# Patient Record
Sex: FEMALE | Race: BLACK OR AFRICAN AMERICAN | NOT HISPANIC OR LATINO | Employment: PART TIME | URBAN - METROPOLITAN AREA
[De-identification: names, ages, dates, MRNs, and addresses within clinical notes are randomized per-mention and may not be internally consistent; named-entity substitution may affect disease eponyms.]

---

## 2017-03-07 ENCOUNTER — TELEPHONE (OUTPATIENT)
Dept: OBSTETRICS AND GYNECOLOGY | Facility: CLINIC | Age: 27
End: 2017-03-07

## 2017-03-07 NOTE — TELEPHONE ENCOUNTER
CALLED PT TO SEE SHE WANTS TO RESCHEDULE HER DEPO APPOINTMENT, SHE STATED SHE IS OUT OF TOWN IN [place], INFORMED HER SHE WILL HAVE TO MAKE A DR'S APPOINTMENT TO RESTART HER DEPO, PT STATED HER UNDERSTANDING

## 2018-02-07 ENCOUNTER — LAB VISIT (OUTPATIENT)
Dept: LAB | Facility: HOSPITAL | Age: 28
End: 2018-02-07
Attending: OBSTETRICS & GYNECOLOGY
Payer: MEDICAID

## 2018-02-07 ENCOUNTER — OFFICE VISIT (OUTPATIENT)
Dept: OBSTETRICS AND GYNECOLOGY | Facility: CLINIC | Age: 28
End: 2018-02-07
Payer: MEDICAID

## 2018-02-07 VITALS
DIASTOLIC BLOOD PRESSURE: 71 MMHG | SYSTOLIC BLOOD PRESSURE: 118 MMHG | WEIGHT: 161.69 LBS | HEIGHT: 66 IN | BODY MASS INDEX: 25.98 KG/M2

## 2018-02-07 DIAGNOSIS — Z00.00 ANNUAL PHYSICAL EXAM: Primary | ICD-10-CM

## 2018-02-07 DIAGNOSIS — Z01.419 ENCOUNTER FOR GYNECOLOGICAL EXAMINATION WITHOUT ABNORMAL FINDING: ICD-10-CM

## 2018-02-07 DIAGNOSIS — Z20.2 POSSIBLE EXPOSURE TO STD: ICD-10-CM

## 2018-02-07 PROCEDURE — 86703 HIV-1/HIV-2 1 RESULT ANTBDY: CPT

## 2018-02-07 PROCEDURE — 80074 ACUTE HEPATITIS PANEL: CPT

## 2018-02-07 PROCEDURE — 3008F BODY MASS INDEX DOCD: CPT | Mod: ,,, | Performed by: OBSTETRICS & GYNECOLOGY

## 2018-02-07 PROCEDURE — 36415 COLL VENOUS BLD VENIPUNCTURE: CPT

## 2018-02-07 PROCEDURE — 99999 PR PBB SHADOW E&M-EST. PATIENT-LVL III: CPT | Mod: PBBFAC,,, | Performed by: OBSTETRICS & GYNECOLOGY

## 2018-02-07 PROCEDURE — 99213 OFFICE O/P EST LOW 20 MIN: CPT | Mod: PBBFAC,25 | Performed by: OBSTETRICS & GYNECOLOGY

## 2018-02-07 PROCEDURE — 88175 CYTOPATH C/V AUTO FLUID REDO: CPT

## 2018-02-07 PROCEDURE — 99395 PREV VISIT EST AGE 18-39: CPT | Mod: S$PBB,,, | Performed by: OBSTETRICS & GYNECOLOGY

## 2018-02-07 PROCEDURE — 87491 CHLMYD TRACH DNA AMP PROBE: CPT

## 2018-02-07 PROCEDURE — 87480 CANDIDA DNA DIR PROBE: CPT

## 2018-02-07 NOTE — PROGRESS NOTES
Subjective:      Chief Complaint:    Chief Complaint   Patient presents with    Gynecologic Exam       Menstrual History:    OB History      Para Term  AB Living    4 2 2   2 2    SAB TAB Ectopic Multiple Live Births      2     2          Menarche age: 12     Patient's last menstrual period was 2018.            Objective:        History of Present Illness AND  Examination detailed DICTATE:         HISTORY OF PRESENT ILLNESS:  The patient is 27 years of age, here for annual   exam,  4, para 2, last menstrual period on the patient was 2018,   was in the past on Depo-Provera, discontinued.  The patient's past medical   history, herpes labialis.  Knee surgery and .  The patient is concerned   with possible STD exposure, requested cultures and blood test.  No other   problems.    REVIEW OF SYSTEMS:  HEAD, EAR, EYES, NOSE, AND THROAT:  Negative.  CARDIORESPIRATORY:  Negative.  GASTROINTESTINAL:  Negative.  GENITOURINARY:  See present illness.  NEUROMUSCULAR:  No problem or complaint.  SKIN:  The patient has a dark mole in the center of the chest, which is rather   dark, flat, irregular borders, no hair in it.  I recommended the patient to see   a dermatologist for a biopsy of this area.  She had a previous biopsy on her   abdomen.  I strongly urged the patient to go and have this area biopsied.    PHYSICAL EXAMINATION:  VITAL SIGNS:  Blood pressure 118/71, weight 161.  GENERAL:  Well developed, well nourished, alert, oriented, no problems.  HEAD:  Normocephalic.  EYES:  Reactive.  NECK:  Supple.  Thyroid is not palpable, no nodes.  CHEST:  Lungs clear except a mole on the chest.  HEART:  Regular sinus rhythm, no murmur.  BREASTS:  No lumps, masses, discharge, skin changes, retraction, nipple changes.    Axilla negative.  ABDOMEN:  Upper abdomen normal.  Lower abdomen normal.  PELVIC:  External normal.  Vulva normal.  Bartholin, urethral and [place] glands   are negative.  Vagina  is clear.  Clear mucus noted from the cervix, possible   indicated ovulatory event.  Uterus is normal size, shape, position, mobile.    Adnexa negative.  Good pelvic support.  No pain elicited on exam.  RECTAL:  Negative.  MUSCULOSKELETAL:  Negative.  NEUROLOGIC:  Grossly normal.  SKIN:  Mole, chest.    RECOMMENDATION:  Biopsy.  The patient stated she is not sexually active at the   present time and she does not wish to have any birth control medication.  Pap   smear was taken.  Cultures were taken.  Also, we will do blood test and we will   see the patient back if it is indicated.      SEAN/CHRISTIANO  dd: 02/07/2018 09:08:07 (CST)  td: 02/08/2018 07:53:57 (CST)  Doc ID   #[id number]  Job ID #[id number]    CC:         Assessment:      Diagnosis: gyn   Exam    Std   exposure       Plan:      Return in 12  months

## 2018-02-08 LAB
C TRACH DNA SPEC QL NAA+PROBE: NOT DETECTED
CANDIDA RRNA VAG QL PROBE: NEGATIVE
G VAGINALIS RRNA GENITAL QL PROBE: NEGATIVE
HAV IGM SERPL QL IA: NEGATIVE
HBV CORE IGM SERPL QL IA: NEGATIVE
HBV SURFACE AG SERPL QL IA: NEGATIVE
HCV AB SERPL QL IA: NEGATIVE
HIV 1+2 AB+HIV1 P24 AG SERPL QL IA: NEGATIVE
N GONORRHOEA DNA SPEC QL NAA+PROBE: NOT DETECTED
T VAGINALIS RRNA GENITAL QL PROBE: NEGATIVE

## 2018-08-30 ENCOUNTER — OFFICE VISIT (OUTPATIENT)
Dept: OBSTETRICS AND GYNECOLOGY | Facility: CLINIC | Age: 28
End: 2018-08-30
Payer: MEDICAID

## 2018-08-30 VITALS
DIASTOLIC BLOOD PRESSURE: 60 MMHG | SYSTOLIC BLOOD PRESSURE: 112 MMHG | WEIGHT: 154.56 LBS | HEIGHT: 66 IN | BODY MASS INDEX: 24.84 KG/M2

## 2018-08-30 DIAGNOSIS — N89.8 VAGINAL DISCHARGE: Primary | ICD-10-CM

## 2018-08-30 PROCEDURE — 99213 OFFICE O/P EST LOW 20 MIN: CPT | Mod: PBBFAC | Performed by: OBSTETRICS & GYNECOLOGY

## 2018-08-30 PROCEDURE — 99213 OFFICE O/P EST LOW 20 MIN: CPT | Mod: S$PBB,,, | Performed by: OBSTETRICS & GYNECOLOGY

## 2018-08-30 PROCEDURE — 99999 PR PBB SHADOW E&M-EST. PATIENT-LVL III: CPT | Mod: PBBFAC,,, | Performed by: OBSTETRICS & GYNECOLOGY

## 2018-08-30 PROCEDURE — 87660 TRICHOMONAS VAGIN DIR PROBE: CPT

## 2018-08-30 NOTE — PROGRESS NOTES
Subjective:      Chief Complaint:    Chief Complaint   Patient presents with    Vaginal Discharge       Menstrual History:    OB History      Para Term  AB Living    4 2 2   2 2    SAB TAB Ectopic Multiple Live Births      2     2          Menarche age: 12     Patient's last menstrual period was 2018 (exact date).            Objective:        History of Present Illness AND  Examination detailed DICTATE:   HISTORY OF PRESENT ILLNESS:  The patient is a 28-year-old lady who presented to   clinic because of vaginal discharge.  She is a  4, para 2.  Pap smear in   2018, negative.  Last menstrual period 2018, regular normal cycles without   any problem.    PAST MEDICAL HISTORY:  Herpes labialis.  Surgery, , arthroscopy.  The   patient discontinued Depo-Provera, complaining of discharge, starting about a   couple of weeks ago.  No new partner's exposure.  No odor or any other problem.    PHYSICAL EXAMINATION:  VITAL SIGNS:  Blood pressure 112/60, weight 154.  CHEST:  Clear.  HEART:  Regular sinus rhythm.  ABDOMEN:  Soft.  No guarding.  PELVIC:  External normal.  Vulva normal.  Bartholin, urethral and [place] are   negative.  Vagina has clear mucus.  Cervix is open, clear mucus, possibly   indicating ovulation.  Uterus, normal size, shape, position.  The right adnexa,   small cyst corpus luteum.  RECTAL:  Negative.    IMPRESSION:  Ovulatory phase, probably excessive mucus.    PLAN:  Vaginal culture.  Reassured the patient that essentially do not feel that   she has any problems.  However, if the culture is positive, we will initiate   treatment.      THOM  dd: 2018 15:55:46 (CDT)  td: 2018 10:31:44 (CDT)  Doc ID   #[id number]  Job ID #[id number]    CC:               Assessment:      Diagnosis:VAG   DISCARGE       Plan:      Return in 6  months

## 2018-08-31 LAB
CANDIDA RRNA VAG QL PROBE: NEGATIVE
G VAGINALIS RRNA GENITAL QL PROBE: NEGATIVE
T VAGINALIS RRNA GENITAL QL PROBE: NEGATIVE

## 2019-06-21 PROBLEM — Z20.2 POSSIBLE EXPOSURE TO STD: Status: RESOLVED | Noted: 2018-02-07 | Resolved: 2019-06-21

## 2019-06-21 PROBLEM — N89.8 VAGINAL DISCHARGE: Status: RESOLVED | Noted: 2018-08-30 | Resolved: 2019-06-21

## 2021-02-11 ENCOUNTER — OFFICE VISIT (OUTPATIENT)
Dept: OBSTETRICS AND GYNECOLOGY | Facility: CLINIC | Age: 31
End: 2021-02-11
Payer: MEDICAID

## 2021-02-11 VITALS — BODY MASS INDEX: 26.4 KG/M2 | WEIGHT: 163.56 LBS | DIASTOLIC BLOOD PRESSURE: 64 MMHG | SYSTOLIC BLOOD PRESSURE: 100 MMHG

## 2021-02-11 DIAGNOSIS — B37.31 YEAST VAGINITIS: ICD-10-CM

## 2021-02-11 DIAGNOSIS — Z01.419 VISIT FOR GYNECOLOGIC EXAMINATION: Primary | ICD-10-CM

## 2021-02-11 DIAGNOSIS — B00.2 PRIMARY HSV INFECTION OF MOUTH: ICD-10-CM

## 2021-02-11 PROCEDURE — 99395 PR PREVENTIVE VISIT,EST,18-39: ICD-10-PCS | Mod: S$PBB,,, | Performed by: OBSTETRICS & GYNECOLOGY

## 2021-02-11 PROCEDURE — 99999 PR PBB SHADOW E&M-EST. PATIENT-LVL II: CPT | Mod: PBBFAC,,, | Performed by: OBSTETRICS & GYNECOLOGY

## 2021-02-11 PROCEDURE — 99395 PREV VISIT EST AGE 18-39: CPT | Mod: S$PBB,,, | Performed by: OBSTETRICS & GYNECOLOGY

## 2021-02-11 PROCEDURE — 87660 TRICHOMONAS VAGIN DIR PROBE: CPT

## 2021-02-11 PROCEDURE — 99212 OFFICE O/P EST SF 10 MIN: CPT | Mod: PBBFAC | Performed by: OBSTETRICS & GYNECOLOGY

## 2021-02-11 PROCEDURE — 87481 CANDIDA DNA AMP PROBE: CPT

## 2021-02-11 PROCEDURE — 87510 GARDNER VAG DNA DIR PROBE: CPT

## 2021-02-11 PROCEDURE — 99999 PR PBB SHADOW E&M-EST. PATIENT-LVL II: ICD-10-PCS | Mod: PBBFAC,,, | Performed by: OBSTETRICS & GYNECOLOGY

## 2021-02-11 RX ORDER — FLUCONAZOLE 150 MG/1
150 TABLET ORAL ONCE
Qty: 1 TABLET | Refills: 1 | Status: SHIPPED | OUTPATIENT
Start: 2021-02-11 | End: 2021-02-11

## 2021-02-11 RX ORDER — VALACYCLOVIR HYDROCHLORIDE 500 MG/1
500 TABLET, FILM COATED ORAL 2 TIMES DAILY
Qty: 10 TABLET | Refills: 3 | Status: SHIPPED | OUTPATIENT
Start: 2021-02-11 | End: 2022-10-27 | Stop reason: SDUPTHER

## 2021-02-12 LAB
CANDIDA RRNA VAG QL PROBE: POSITIVE
G VAGINALIS RRNA GENITAL QL PROBE: POSITIVE
T VAGINALIS RRNA GENITAL QL PROBE: NEGATIVE

## 2021-02-20 ENCOUNTER — PATIENT MESSAGE (OUTPATIENT)
Dept: OBSTETRICS AND GYNECOLOGY | Facility: CLINIC | Age: 31
End: 2021-02-20

## 2021-02-20 DIAGNOSIS — N76.0 BV (BACTERIAL VAGINOSIS): Primary | ICD-10-CM

## 2021-02-20 DIAGNOSIS — B96.89 BV (BACTERIAL VAGINOSIS): Primary | ICD-10-CM

## 2021-02-20 RX ORDER — METRONIDAZOLE 7.5 MG/G
1 GEL VAGINAL DAILY
Qty: 70 G | Refills: 0 | Status: SHIPPED | OUTPATIENT
Start: 2021-02-20 | End: 2021-02-25

## 2021-04-15 ENCOUNTER — PATIENT MESSAGE (OUTPATIENT)
Dept: RESEARCH | Facility: HOSPITAL | Age: 31
End: 2021-04-15

## 2021-06-25 ENCOUNTER — TELEPHONE (OUTPATIENT)
Dept: OBSTETRICS AND GYNECOLOGY | Facility: CLINIC | Age: 31
End: 2021-06-25

## 2021-06-25 ENCOUNTER — PATIENT MESSAGE (OUTPATIENT)
Dept: OBSTETRICS AND GYNECOLOGY | Facility: CLINIC | Age: 31
End: 2021-06-25

## 2021-06-25 RX ORDER — METRONIDAZOLE 500 MG/1
500 TABLET ORAL 2 TIMES DAILY
Qty: 10 TABLET | Refills: 1 | Status: SHIPPED | OUTPATIENT
Start: 2021-06-25 | End: 2021-06-30

## 2021-07-01 ENCOUNTER — PATIENT MESSAGE (OUTPATIENT)
Dept: ADMINISTRATIVE | Facility: OTHER | Age: 31
End: 2021-07-01

## 2021-10-12 ENCOUNTER — HOSPITAL ENCOUNTER (EMERGENCY)
Facility: HOSPITAL | Age: 31
Discharge: HOME | End: 2021-10-12
Attending: EMERGENCY MEDICINE
Payer: MEDICAID

## 2021-10-12 VITALS
RESPIRATION RATE: 18 BRPM | HEART RATE: 82 BPM | DIASTOLIC BLOOD PRESSURE: 64 MMHG | OXYGEN SATURATION: 98 % | WEIGHT: 150 LBS | HEIGHT: 68 IN | SYSTOLIC BLOOD PRESSURE: 96 MMHG | TEMPERATURE: 98 F | BODY MASS INDEX: 22.73 KG/M2

## 2021-10-12 DIAGNOSIS — R55 SYNCOPE: Primary | ICD-10-CM

## 2021-10-12 LAB
ALBUMIN SERPL BCP-MCNC: 3.8 G/DL (ref 3.5–5.2)
ALP SERPL-CCNC: 41 U/L (ref 55–135)
ALT SERPL W/O P-5'-P-CCNC: 21 U/L (ref 10–44)
ANION GAP SERPL CALC-SCNC: 8 MMOL/L (ref 8–16)
AST SERPL-CCNC: 21 U/L (ref 10–40)
B-HCG UR QL: NEGATIVE
BACTERIA #/AREA URNS HPF: ABNORMAL /HPF
BASOPHILS # BLD AUTO: 0.04 K/UL (ref 0–0.2)
BASOPHILS NFR BLD: 0.3 % (ref 0–1.9)
BILIRUB SERPL-MCNC: 0.5 MG/DL (ref 0.1–1)
BILIRUB UR QL STRIP: NEGATIVE
BUN SERPL-MCNC: 14 MG/DL (ref 6–20)
CALCIUM SERPL-MCNC: 9.3 MG/DL (ref 8.7–10.5)
CHLORIDE SERPL-SCNC: 111 MMOL/L (ref 95–110)
CLARITY UR: CLEAR
CO2 SERPL-SCNC: 21 MMOL/L (ref 23–29)
COLOR UR: YELLOW
CREAT SERPL-MCNC: 0.8 MG/DL (ref 0.5–1.4)
CTP QC/QA: YES
DIFFERENTIAL METHOD: ABNORMAL
EOSINOPHIL # BLD AUTO: 0 K/UL (ref 0–0.5)
EOSINOPHIL NFR BLD: 0.1 % (ref 0–8)
ERYTHROCYTE [DISTWIDTH] IN BLOOD BY AUTOMATED COUNT: 14.5 % (ref 11.5–14.5)
EST. GFR  (AFRICAN AMERICAN): >60 ML/MIN/1.73 M^2
EST. GFR  (NON AFRICAN AMERICAN): >60 ML/MIN/1.73 M^2
GLUCOSE SERPL-MCNC: 105 MG/DL (ref 70–110)
GLUCOSE UR QL STRIP: NEGATIVE
HCT VFR BLD AUTO: 39.7 % (ref 37–48.5)
HGB BLD-MCNC: 12.5 G/DL (ref 12–16)
HGB UR QL STRIP: NEGATIVE
HYALINE CASTS #/AREA URNS LPF: 0 /LPF
IMM GRANULOCYTES # BLD AUTO: 0.06 K/UL (ref 0–0.04)
IMM GRANULOCYTES NFR BLD AUTO: 0.4 % (ref 0–0.5)
KETONES UR QL STRIP: ABNORMAL
LEUKOCYTE ESTERASE UR QL STRIP: NEGATIVE
LYMPHOCYTES # BLD AUTO: 1.1 K/UL (ref 1–4.8)
LYMPHOCYTES NFR BLD: 7.8 % (ref 18–48)
MCH RBC QN AUTO: 27.7 PG (ref 27–31)
MCHC RBC AUTO-ENTMCNC: 31.5 G/DL (ref 32–36)
MCV RBC AUTO: 88 FL (ref 82–98)
MICROSCOPIC COMMENT: ABNORMAL
MONOCYTES # BLD AUTO: 0.5 K/UL (ref 0.3–1)
MONOCYTES NFR BLD: 3.4 % (ref 4–15)
NEUTROPHILS # BLD AUTO: 12.4 K/UL (ref 1.8–7.7)
NEUTROPHILS NFR BLD: 88 % (ref 38–73)
NITRITE UR QL STRIP: NEGATIVE
NRBC BLD-RTO: 0 /100 WBC
PH UR STRIP: 6 [PH] (ref 5–8)
PLATELET # BLD AUTO: 219 K/UL (ref 150–450)
PMV BLD AUTO: 11.2 FL (ref 9.2–12.9)
POTASSIUM SERPL-SCNC: 4.2 MMOL/L (ref 3.5–5.1)
PROT SERPL-MCNC: 7.6 G/DL (ref 6–8.4)
PROT UR QL STRIP: ABNORMAL
RBC # BLD AUTO: 4.52 M/UL (ref 4–5.4)
RBC #/AREA URNS HPF: 3 /HPF (ref 0–4)
SODIUM SERPL-SCNC: 140 MMOL/L (ref 136–145)
SP GR UR STRIP: >1.03 (ref 1–1.03)
SQUAMOUS #/AREA URNS HPF: 13 /HPF
URN SPEC COLLECT METH UR: ABNORMAL
UROBILINOGEN UR STRIP-ACNC: NEGATIVE EU/DL
WBC # BLD AUTO: 14.1 K/UL (ref 3.9–12.7)
WBC #/AREA URNS HPF: 6 /HPF (ref 0–5)

## 2021-10-12 PROCEDURE — 81025 URINE PREGNANCY TEST: CPT | Performed by: EMERGENCY MEDICINE

## 2021-10-12 PROCEDURE — 93010 EKG 12-LEAD: ICD-10-PCS | Mod: ,,, | Performed by: INTERNAL MEDICINE

## 2021-10-12 PROCEDURE — 93010 ELECTROCARDIOGRAM REPORT: CPT | Mod: ,,, | Performed by: INTERNAL MEDICINE

## 2021-10-12 PROCEDURE — 80053 COMPREHEN METABOLIC PANEL: CPT | Performed by: EMERGENCY MEDICINE

## 2021-10-12 PROCEDURE — 85025 COMPLETE CBC W/AUTO DIFF WBC: CPT | Performed by: EMERGENCY MEDICINE

## 2021-10-12 PROCEDURE — 81000 URINALYSIS NONAUTO W/SCOPE: CPT | Performed by: EMERGENCY MEDICINE

## 2021-10-12 PROCEDURE — 25000003 PHARM REV CODE 250: Performed by: EMERGENCY MEDICINE

## 2021-10-12 PROCEDURE — 93005 ELECTROCARDIOGRAM TRACING: CPT

## 2021-10-12 PROCEDURE — 99284 EMERGENCY DEPT VISIT MOD MDM: CPT | Mod: 25

## 2021-10-12 RX ORDER — HYDROCODONE BITARTRATE AND ACETAMINOPHEN 10; 325 MG/1; MG/1
1 TABLET ORAL
Status: COMPLETED | OUTPATIENT
Start: 2021-10-12 | End: 2021-10-12

## 2021-10-12 RX ORDER — SODIUM CHLORIDE 9 MG/ML
INJECTION, SOLUTION INTRAVENOUS
Status: COMPLETED | OUTPATIENT
Start: 2021-10-12 | End: 2021-10-12

## 2021-10-12 RX ORDER — METRONIDAZOLE 500 MG/1
500 TABLET ORAL 3 TIMES DAILY
Qty: 21 TABLET | Refills: 0 | Status: SHIPPED | OUTPATIENT
Start: 2021-10-12 | End: 2021-10-19

## 2021-10-12 RX ADMIN — HYDROCODONE BITARTRATE AND ACETAMINOPHEN 1 TABLET: 10; 325 TABLET ORAL at 07:10

## 2021-10-12 RX ADMIN — SODIUM CHLORIDE: 0.9 INJECTION, SOLUTION INTRAVENOUS at 07:10

## 2021-10-27 ENCOUNTER — OFFICE VISIT (OUTPATIENT)
Dept: PRIMARY CARE CLINIC | Facility: CLINIC | Age: 31
End: 2021-10-27
Payer: MEDICAID

## 2021-10-27 VITALS
DIASTOLIC BLOOD PRESSURE: 76 MMHG | HEART RATE: 85 BPM | SYSTOLIC BLOOD PRESSURE: 104 MMHG | WEIGHT: 166 LBS | OXYGEN SATURATION: 99 % | BODY MASS INDEX: 25.16 KG/M2 | HEIGHT: 68 IN

## 2021-10-27 DIAGNOSIS — G43.009 MIGRAINE WITHOUT AURA AND WITHOUT STATUS MIGRAINOSUS, NOT INTRACTABLE: Primary | ICD-10-CM

## 2021-10-27 DIAGNOSIS — R41.840 POOR CONCENTRATION: ICD-10-CM

## 2021-10-27 PROCEDURE — 99203 PR OFFICE/OUTPT VISIT, NEW, LEVL III, 30-44 MIN: ICD-10-PCS | Mod: S$PBB,,, | Performed by: NURSE PRACTITIONER

## 2021-10-27 PROCEDURE — 99999 PR PBB SHADOW E&M-EST. PATIENT-LVL III: CPT | Mod: PBBFAC,,, | Performed by: NURSE PRACTITIONER

## 2021-10-27 PROCEDURE — 99203 OFFICE O/P NEW LOW 30 MIN: CPT | Mod: S$PBB,,, | Performed by: NURSE PRACTITIONER

## 2021-10-27 PROCEDURE — 99999 PR PBB SHADOW E&M-EST. PATIENT-LVL III: ICD-10-PCS | Mod: PBBFAC,,, | Performed by: NURSE PRACTITIONER

## 2021-10-27 PROCEDURE — 99213 OFFICE O/P EST LOW 20 MIN: CPT | Mod: PBBFAC,PN | Performed by: NURSE PRACTITIONER

## 2021-10-27 RX ORDER — SUMATRIPTAN 50 MG/1
50 TABLET, FILM COATED ORAL
Qty: 9 TABLET | Refills: 1 | Status: SHIPPED | OUTPATIENT
Start: 2021-10-27 | End: 2021-11-26

## 2022-10-27 ENCOUNTER — OFFICE VISIT (OUTPATIENT)
Dept: OBSTETRICS AND GYNECOLOGY | Facility: CLINIC | Age: 32
End: 2022-10-27
Payer: MEDICAID

## 2022-10-27 ENCOUNTER — LAB VISIT (OUTPATIENT)
Dept: LAB | Facility: HOSPITAL | Age: 32
End: 2022-10-27
Attending: OBSTETRICS & GYNECOLOGY
Payer: MEDICAID

## 2022-10-27 VITALS
SYSTOLIC BLOOD PRESSURE: 108 MMHG | DIASTOLIC BLOOD PRESSURE: 72 MMHG | HEIGHT: 68 IN | BODY MASS INDEX: 25.2 KG/M2 | WEIGHT: 166.25 LBS

## 2022-10-27 DIAGNOSIS — B00.1 HERPES LABIALIS: ICD-10-CM

## 2022-10-27 DIAGNOSIS — Z11.3 SCREEN FOR STD (SEXUALLY TRANSMITTED DISEASE): ICD-10-CM

## 2022-10-27 DIAGNOSIS — B37.31 VAGINAL YEAST INFECTION: Primary | ICD-10-CM

## 2022-10-27 PROCEDURE — 99999 PR PBB SHADOW E&M-EST. PATIENT-LVL III: CPT | Mod: PBBFAC,,, | Performed by: OBSTETRICS & GYNECOLOGY

## 2022-10-27 PROCEDURE — 1160F RVW MEDS BY RX/DR IN RCRD: CPT | Mod: CPTII,,, | Performed by: OBSTETRICS & GYNECOLOGY

## 2022-10-27 PROCEDURE — 3008F BODY MASS INDEX DOCD: CPT | Mod: CPTII,,, | Performed by: OBSTETRICS & GYNECOLOGY

## 2022-10-27 PROCEDURE — 1159F MED LIST DOCD IN RCRD: CPT | Mod: CPTII,,, | Performed by: OBSTETRICS & GYNECOLOGY

## 2022-10-27 PROCEDURE — 1159F PR MEDICATION LIST DOCUMENTED IN MEDICAL RECORD: ICD-10-PCS | Mod: CPTII,,, | Performed by: OBSTETRICS & GYNECOLOGY

## 2022-10-27 PROCEDURE — 1160F PR REVIEW ALL MEDS BY PRESCRIBER/CLIN PHARMACIST DOCUMENTED: ICD-10-PCS | Mod: CPTII,,, | Performed by: OBSTETRICS & GYNECOLOGY

## 2022-10-27 PROCEDURE — 99999 PR PBB SHADOW E&M-EST. PATIENT-LVL III: ICD-10-PCS | Mod: PBBFAC,,, | Performed by: OBSTETRICS & GYNECOLOGY

## 2022-10-27 PROCEDURE — 3078F PR MOST RECENT DIASTOLIC BLOOD PRESSURE < 80 MM HG: ICD-10-PCS | Mod: CPTII,,, | Performed by: OBSTETRICS & GYNECOLOGY

## 2022-10-27 PROCEDURE — 99213 OFFICE O/P EST LOW 20 MIN: CPT | Mod: PBBFAC | Performed by: OBSTETRICS & GYNECOLOGY

## 2022-10-27 PROCEDURE — 3074F PR MOST RECENT SYSTOLIC BLOOD PRESSURE < 130 MM HG: ICD-10-PCS | Mod: CPTII,,, | Performed by: OBSTETRICS & GYNECOLOGY

## 2022-10-27 PROCEDURE — 3074F SYST BP LT 130 MM HG: CPT | Mod: CPTII,,, | Performed by: OBSTETRICS & GYNECOLOGY

## 2022-10-27 PROCEDURE — 99213 OFFICE O/P EST LOW 20 MIN: CPT | Mod: S$PBB,,, | Performed by: OBSTETRICS & GYNECOLOGY

## 2022-10-27 PROCEDURE — 3008F PR BODY MASS INDEX (BMI) DOCUMENTED: ICD-10-PCS | Mod: CPTII,,, | Performed by: OBSTETRICS & GYNECOLOGY

## 2022-10-27 PROCEDURE — 87491 CHLMYD TRACH DNA AMP PROBE: CPT | Performed by: OBSTETRICS & GYNECOLOGY

## 2022-10-27 PROCEDURE — 3078F DIAST BP <80 MM HG: CPT | Mod: CPTII,,, | Performed by: OBSTETRICS & GYNECOLOGY

## 2022-10-27 PROCEDURE — 87591 N.GONORRHOEAE DNA AMP PROB: CPT | Performed by: OBSTETRICS & GYNECOLOGY

## 2022-10-27 PROCEDURE — 99213 PR OFFICE/OUTPT VISIT, EST, LEVL III, 20-29 MIN: ICD-10-PCS | Mod: S$PBB,,, | Performed by: OBSTETRICS & GYNECOLOGY

## 2022-10-27 RX ORDER — VALACYCLOVIR HYDROCHLORIDE 500 MG/1
500 TABLET, FILM COATED ORAL 2 TIMES DAILY PRN
Qty: 30 TABLET | Refills: 3 | Status: SHIPPED | OUTPATIENT
Start: 2022-10-27

## 2022-10-27 RX ORDER — FLUCONAZOLE 150 MG/1
150 TABLET ORAL
Qty: 6 TABLET | Refills: 1 | Status: SHIPPED | OUTPATIENT
Start: 2022-10-27

## 2022-10-27 NOTE — PROGRESS NOTES
"[facility]  Ambulatory Clinic  Obstetrics & Gynecology    Visit Date:  10/27/2022    Chief Complaint:  Vaginal yeast infection, oral herpes on lips    History of Present Illness:      Damon Noguera is a 32 y.o.  here with c/o vaginal yeast infection and oral herpes on lips.    Pt described discharge as itchy, white, non bloody, without pelvic pain or abnormal vaginal bleeding for past few days.    Pt reports h/o oral herpes on lips and improvement with valtrex.      Pt denies abnormal vaginal bleeding, dysmenorrhea, dyspareunia, pelvic pain, breast mass/skin changes, incontinence, GI or urinary complaints.      Review of Systems:      GENERAL:  No fever, fatigue, excessive weight gain or loss  HEENT:  No headaches, hearing changes, visual disturbance  RESPIRATORY:  No cough, shortness of breath  CARDIOVASCULAR:  No chest pain, heart palpitations, leg swelling  GASTROINTESTINAL:  No nausea, vomiting, constipation, diarrhea, abd pain, rectal bleeding   GENITOURINARY:  See HPI    Physical Exam:     /72   Ht 5' 8" (1.727 m)   Wt 75.4 kg (166 lb 3.6 oz)   LMP 10/02/2022   BMI 25.27 kg/m²   Pulse 50's, Resp rate 16     GENERAL:  No acute distress, well-nourished  HEENT:  Atraumatic, anicteric, moist mucus membranes. Neck supple w/o masses. Resolving small herpetic sore on lip.  LUNGS:  Clear normal respiratory effort  HEART:  Regular rate and rhythm  ABDOMEN:  Soft, non-tender, non-distended, no obvious organomegaly    GENITOURINARY:    VULVAR:  Female external genitalia w/o obvious lesions. Female hair distribution. Normal urethral meatus. No gross lymphadenopathy.    VAGINA:  Normal vaginal mucosa. Good support. No obvious lesion. Mild yeast discharge.  CERVIX:  No cervical motion tenderness, discharge, or obvious lesions.   UTERUS:  Small, non-tender, normal contour  ADNEXA:  No masses, non-tender    RECTAL:  Deferred. No obvious external lesions    Chaperone present for " exam.    Assessment:     32 y.o. :    Vaginal yeast infection  Herpes labialis     Plan:    Diflucan for yeast infection.  If unresolved, use monistat 7.  Hygiene advice.    Discussed oral herpes.  Refill valtrex.  Safe sex.    STI screening - pt requested gonorrhea & chlamydia testing.      Encourage healthy lifestyle modifications.    F/u with PCP for health maintenance.     Return 3 months for gynecologic exam, or sooner as needed.  All questions answered, pt voiced understanding.        Tam Kathleen MD

## 2022-10-31 LAB
C TRACH DNA SPEC QL NAA+PROBE: NOT DETECTED
N GONORRHOEA DNA SPEC QL NAA+PROBE: NOT DETECTED

## 2022-11-17 ENCOUNTER — HOSPITAL ENCOUNTER (EMERGENCY)
Facility: HOSPITAL | Age: 32
Discharge: HOME | End: 2022-11-17
Attending: EMERGENCY MEDICINE
Payer: MEDICAID

## 2022-11-17 VITALS
RESPIRATION RATE: 16 BRPM | SYSTOLIC BLOOD PRESSURE: 111 MMHG | BODY MASS INDEX: 24.29 KG/M2 | WEIGHT: 164 LBS | OXYGEN SATURATION: 100 % | HEART RATE: 86 BPM | DIASTOLIC BLOOD PRESSURE: 64 MMHG | HEIGHT: 69 IN | TEMPERATURE: 99 F

## 2022-11-17 DIAGNOSIS — S13.4XXA WHIPLASH INJURY TO NECK, INITIAL ENCOUNTER: Primary | ICD-10-CM
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
## 2022-11-17 LAB
B-HCG UR QL: NEGATIVE
CTP QC/QA: YES

## 2022-11-17 PROCEDURE — 25000003 PHARM REV CODE 250

## 2022-11-17 PROCEDURE — 81025 URINE PREGNANCY TEST: CPT | Performed by: STUDENT IN AN ORGANIZED HEALTH CARE EDUCATION/TRAINING PROGRAM

## 2022-11-17 PROCEDURE — 99284 EMERGENCY DEPT VISIT MOD MDM: CPT

## 2022-11-17 RX ORDER — CYCLOBENZAPRINE HCL 10 MG
10 TABLET ORAL 3 TIMES DAILY PRN
Qty: 20 TABLET | Refills: 0 | Status: SHIPPED | OUTPATIENT
Start: 2022-11-17

## 2022-11-17 RX ORDER — LIDOCAINE 50 MG/G
1 PATCH TOPICAL DAILY
Qty: 15 PATCH | Refills: 0 | Status: SHIPPED | OUTPATIENT
Start: 2022-11-17

## 2022-11-17 RX ORDER — HYDROCODONE BITARTRATE AND ACETAMINOPHEN 5; 325 MG/1; MG/1
1 TABLET ORAL EVERY 6 HOURS PRN
Qty: 12 TABLET | Refills: 0 | Status: SHIPPED | OUTPATIENT
Start: 2022-11-17

## 2022-11-17 RX ORDER — IBUPROFEN 600 MG/1
600 TABLET ORAL EVERY 6 HOURS PRN
Qty: 20 TABLET | Refills: 0 | Status: SHIPPED | OUTPATIENT
Start: 2022-11-17 | End: 2023-02-08 | Stop reason: SDUPTHER

## 2022-11-17 RX ORDER — IBUPROFEN 600 MG/1
600 TABLET ORAL
Status: COMPLETED | OUTPATIENT
Start: 2022-11-17 | End: 2022-11-17

## 2022-11-17 RX ORDER — LIDOCAINE 50 MG/G
1 PATCH TOPICAL
Status: DISCONTINUED | OUTPATIENT
Start: 2022-11-17 | End: 2022-11-17 | Stop reason: HOSPADM

## 2022-11-17 RX ADMIN — LIDOCAINE 5% 1 PATCH: 700 PATCH TOPICAL at 04:11

## 2022-11-17 RX ADMIN — IBUPROFEN 600 MG: 600 TABLET ORAL at 04:11
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
## 2022-11-17 NOTE — DISCHARGE INSTRUCTIONS
Thank you for coming to our Emergency Department today. It is important to remember that some problems are difficult to diagnose and may not be found during your first visit. Be sure to follow up with your primary care doctor and review any labs/imaging that was performed with them. If you do not have a primary care doctor, you may contact the one listed on your discharge paperwork or you may also call the [facility] Appointment Desk at [phone] to schedule an appointment with one.     All medications may potentially have side effects and it is impossible to predict which medications may give you side effects. If you feel that you are having a negative effect of any medication you should immediately stop taking them and seek medical attention.    Return to the ER with any questions/concerns, new/concerning symptoms, worsening or failure to improve. Do not drive or make any important decisions for 24 hours if you have received any pain medications, sedatives or mood altering drugs during your ER visit.

## 2023-02-08 ENCOUNTER — HOSPITAL ENCOUNTER (EMERGENCY)
Facility: HOSPITAL | Age: 33
Discharge: HOME | End: 2023-02-08
Attending: EMERGENCY MEDICINE
Payer: MEDICAID

## 2023-02-08 VITALS
SYSTOLIC BLOOD PRESSURE: 116 MMHG | HEIGHT: 67 IN | BODY MASS INDEX: 27.47 KG/M2 | OXYGEN SATURATION: 100 % | WEIGHT: 175 LBS | TEMPERATURE: 98 F | RESPIRATION RATE: 18 BRPM | HEART RATE: 80 BPM | DIASTOLIC BLOOD PRESSURE: 65 MMHG

## 2023-02-08 DIAGNOSIS — H20.9 IRITIS OF RIGHT EYE: Primary | ICD-10-CM

## 2023-02-08 LAB
B-HCG UR QL: NEGATIVE
CTP QC/QA: YES

## 2023-02-08 PROCEDURE — 99283 EMERGENCY DEPT VISIT LOW MDM: CPT

## 2023-02-08 PROCEDURE — 25000003 PHARM REV CODE 250: Performed by: PHYSICIAN ASSISTANT

## 2023-02-08 PROCEDURE — 81025 URINE PREGNANCY TEST: CPT | Performed by: PHYSICIAN ASSISTANT

## 2023-02-08 RX ORDER — CYCLOPENTOLATE HYDROCHLORIDE 10 MG/ML
2 SOLUTION/ DROPS OPHTHALMIC
Status: COMPLETED | OUTPATIENT
Start: 2023-02-08 | End: 2023-02-08

## 2023-02-08 RX ORDER — TETRACAINE HYDROCHLORIDE 5 MG/ML
2 SOLUTION OPHTHALMIC
Status: COMPLETED | OUTPATIENT
Start: 2023-02-08 | End: 2023-02-08

## 2023-02-08 RX ORDER — IBUPROFEN 600 MG/1
600 TABLET ORAL EVERY 6 HOURS PRN
Qty: 20 TABLET | Refills: 0 | Status: SHIPPED | OUTPATIENT
Start: 2023-02-08

## 2023-02-08 RX ADMIN — TETRACAINE HYDROCHLORIDE 2 DROP: 5 SOLUTION OPHTHALMIC at 01:02

## 2023-02-08 RX ADMIN — FLUORESCEIN SODIUM 1 EACH: 1 STRIP OPHTHALMIC at 01:02

## 2023-02-08 RX ADMIN — CYCLOPENTOLATE HYDROCHLORIDE 2 DROP: 10 SOLUTION/ DROPS OPHTHALMIC at 02:02

## 2023-02-08 NOTE — ED PROVIDER NOTES
Encounter Date: 2023    SCRIBE #1 NOTE: I, Margaret Hernandez, am scribing for, and in the presence of,  Yari Godinez PA-C. I have scribed the following portions of the note - Other sections scribed: HPI, ROS.     History     Chief Complaint   Patient presents with    Eye Pain     Pt c/o pain to right eye. Pt stated pain began yesterday. Pt stated it feels as if something is in her eye and hurts to open with light sensitivity.      CC: Eye Pain    Lacrystal NEFTALI Noguera is a 32 y.o. female, with no pertinent past medical/surgical history, presents to the ED, via private vehicle, with a chief complaint of right eye pain and irritation, onset 1 day ago. Patient further complains of associated right eye drainage, redness, and photophobia. Patient reports it felt like a foreign body was in her eye, so she attempted to remove it with her hands, rinsed eye with water, and contact solution. All attempts did not provide any relief. No other alleviating or exacerbating factors noted. Patient denies recent eye trauma, or any other associated symptoms. Per chart, patient has NKDA.    The history is provided by the patient and medical records. No  was used.   Review of patient's allergies indicates:  No Known Allergies  Past Medical History:   Diagnosis Date    Back problem     Neck problem      Past Surgical History:   Procedure Laterality Date     SECTION      KNEE SURGERY      LIPOSUCTION OF ABDOMEN      2021     Family History   Problem Relation Age of Onset    Diabetes Father     Cancer Neg Hx     Hypertension Neg Hx      Social History     Tobacco Use    Smoking status: Former    Smokeless tobacco: Never   Substance Use Topics    Alcohol use: Yes     Comment: socially    Drug use: No     Review of Systems   Constitutional:  Negative for chills and fever.   HENT:  Negative for congestion, rhinorrhea and sore throat.    Eyes:  Positive for photophobia (Right eye), pain (Right eye), discharge and  redness (Right eye). Negative for visual disturbance.   Respiratory:  Negative for cough and shortness of breath.    Cardiovascular:  Negative for chest pain.   Gastrointestinal:  Negative for abdominal pain, blood in stool, constipation, diarrhea, nausea and vomiting.   Genitourinary:  Negative for dysuria, frequency, hematuria and urgency.   Musculoskeletal:  Negative for arthralgias, back pain, joint swelling, myalgias and neck pain.   Skin:  Negative for color change, rash and wound.   Neurological:  Negative for dizziness, weakness, numbness and headaches.     Physical Exam     Initial Vitals [02/08/23 1326]   BP Pulse Resp Temp SpO2   117/69 98 18 98.1 °F (36.7 °C) 99 %      MAP       --         Physical Exam    Constitutional: She appears well-developed and well-nourished.   Eyes: EOM and lids are normal. Pupils are equal, round, and reactive to light. Lids are everted and swept, no foreign bodies found. Right eye exhibits no discharge. Left eye exhibits no discharge. Right conjunctiva is injected.   Neck: Neck supple.   Normal range of motion.  Cardiovascular:  Normal rate, regular rhythm, normal heart sounds and intact distal pulses.           Musculoskeletal:         General: Normal range of motion.      Cervical back: Normal range of motion and neck supple.     Neurological: She is alert and oriented to person, place, and time. She has normal strength.   Skin: Skin is warm.       ED Course   Procedures  Labs Reviewed   POCT URINE PREGNANCY          Imaging Results    None          Medications   TETRAcaine HCl (PF) 0.5 % Drop 2 drop (2 drops Left Eye Given by Other 2/8/23 6709)   fluorescein ophthalmic strip 1 each (1 each Left Eye Given by Other 2/8/23 3949)   cyclopentolate 1% ophthalmic solution 2 drop (2 drops Right Eye Given by Other 2/8/23 6328)     Medical Decision Making:   Initial Assessment:   No evidence of corneal abrasion or foreign body seen on exam.  Patient does have photophobia clinically.   Patient's symptoms improved with Cyclogyl.  Given the above I suspect patient has acute iritis.  I will discharge patient home with Cyclogyl and ibuprofen.  Patient instructed to follow-up with ophthalmologist.  Visual acuity is 20 40 in both eyes.  I do not suspect an acute eye emergency at this time.  Patient is stable for discharge.        Scribe Attestation:   Scribe #1: I performed the above scribed service and the documentation accurately describes the services I performed. I attest to the accuracy of the note.                   Clinical Impression:   Final diagnoses:  [H20.9] Iritis of right eye (Primary)        ED Disposition Condition    Discharge Stable        I, Yari Godinez PA-C , personally performed the services described in this documentation. All medical record entries made by the scribe were at my direction and in my presence. I have reviewed the chart and agree that the record reflects my personal performance and is accurate and complete.     ED Prescriptions       Medication Sig Dispense Start Date End Date Auth. Provider    ibuprofen (ADVIL,MOTRIN) 600 MG tablet Take 1 tablet (600 mg total) by mouth every 6 (six) hours as needed for Pain. 20 tablet 2/8/2023 -- AIXA Hays          Follow-up Information       Follow up With Specialties Details Why Contact Info    Andres John MD Ophthalmology Schedule an appointment as soon as possible for a visit   [address] [facility]  [place] [address]  [phone]               AIXA Hays  02/08/23 1954

## 2023-02-08 NOTE — Clinical Note
"Lacrystal "LACRYSTAL" Trim was seen and treated in our emergency department on 2/8/2023.  She may return to work on 02/10/2023.       If you have any questions or concerns, please don't hesitate to call.      Lenore HOPE    "

## 2023-02-09 ENCOUNTER — TELEPHONE (OUTPATIENT)
Dept: OPHTHALMOLOGY | Facility: CLINIC | Age: 33
End: 2023-02-09
Payer: MEDICAID

## 2023-02-09 NOTE — TELEPHONE ENCOUNTER
----- Message from Angela Diez sent at 2/9/2023  9:26 AM CST -----  Contact: Damon @[phone]  Pt needs a call back regarding an ER fu appt. Please give her a call back to further assist

## 2023-02-10 ENCOUNTER — OFFICE VISIT (OUTPATIENT)
Dept: OPTOMETRY | Facility: CLINIC | Age: 33
End: 2023-02-10
Payer: MEDICAID

## 2023-02-10 DIAGNOSIS — H20.00 ACUTE IRITIS, RIGHT EYE: Primary | ICD-10-CM

## 2023-02-10 PROCEDURE — 1159F MED LIST DOCD IN RCRD: CPT | Mod: CPTII,,, | Performed by: OPTOMETRIST

## 2023-02-10 PROCEDURE — 99212 OFFICE O/P EST SF 10 MIN: CPT | Mod: PBBFAC,PO | Performed by: OPTOMETRIST

## 2023-02-10 PROCEDURE — 1159F PR MEDICATION LIST DOCUMENTED IN MEDICAL RECORD: ICD-10-PCS | Mod: CPTII,,, | Performed by: OPTOMETRIST

## 2023-02-10 PROCEDURE — 99999 PR PBB SHADOW E&M-EST. PATIENT-LVL II: ICD-10-PCS | Mod: PBBFAC,,, | Performed by: OPTOMETRIST

## 2023-02-10 PROCEDURE — 92004 COMPRE OPH EXAM NEW PT 1/>: CPT | Mod: S$PBB,,, | Performed by: OPTOMETRIST

## 2023-02-10 PROCEDURE — 92004 PR EYE EXAM, NEW PATIENT,COMPREHESV: ICD-10-PCS | Mod: S$PBB,,, | Performed by: OPTOMETRIST

## 2023-02-10 PROCEDURE — 99999 PR PBB SHADOW E&M-EST. PATIENT-LVL II: CPT | Mod: PBBFAC,,, | Performed by: OPTOMETRIST

## 2023-02-10 RX ORDER — PREDNISOLONE ACETATE 10 MG/ML
SUSPENSION/ DROPS OPHTHALMIC
Qty: 5 ML | Refills: 0 | Status: SHIPPED | OUTPATIENT
Start: 2023-02-10 | End: 2023-02-26

## 2023-02-10 RX ORDER — CLINDAMYCIN PHOSPHATE 11.9 MG/ML
SOLUTION TOPICAL 2 TIMES DAILY
COMMUNITY
Start: 2022-11-30 | End: 2023-02-10 | Stop reason: CLARIF

## 2023-02-10 RX ORDER — BENZOYL PEROXIDE 100 MG/ML
LIQUID TOPICAL
COMMUNITY
Start: 2022-12-03

## 2023-02-10 NOTE — PROGRESS NOTES
Subjective:       Patient ID: Damon Noguera is a 32 y.o. female      Chief Complaint   Patient presents with    Follow-up     History of Present Illness  32 year old female is here today for ER follow up iritis. Patient c/o light sensitive. Patient feel her eye roam when she taking her sun glasses off.  Patient denies pain, flashes , floaters, and no double vision  .  This been going on since Tuesday this week.  + headaches   + cloudy vision     Pt states Monday thinks dust got in her right eye, Tuesday eye felt irritated, Wednesday eye was blurry. Was seen in ED, given ibuprofen and cyclogel to use q8h but only using qhs yovani it  makes her vision blurry    Eye medication   cyclogel at night time only       Assessment/Plan:     1. Acute iritis, right eye  PF with 2 week taper OD. Can use cyclogel but advised pt of near vision blur and effects of cycloplegic.   - prednisoLONE acetate (PRED FORTE) 1 % DrpS; Place 1 drop into the right eye every 2 (two) hours for 2 days, THEN 1 drop 4 (four) times daily for 7 days, THEN 1 drop 2 (two) times daily for 7 days. Remember to shake the medication before use..  Dispense: 5 mL; Refill: 0    Follow up if symptoms worsen or fail to improve.

## 2023-04-03 ENCOUNTER — OFFICE VISIT (OUTPATIENT)
Dept: OBSTETRICS AND GYNECOLOGY | Facility: CLINIC | Age: 33
End: 2023-04-03
Payer: MEDICAID

## 2023-04-03 VITALS — DIASTOLIC BLOOD PRESSURE: 64 MMHG | SYSTOLIC BLOOD PRESSURE: 106 MMHG | BODY MASS INDEX: 25.5 KG/M2 | WEIGHT: 162.81 LBS

## 2023-04-03 DIAGNOSIS — Z11.3 SCREEN FOR STD (SEXUALLY TRANSMITTED DISEASE): ICD-10-CM

## 2023-04-03 DIAGNOSIS — Z01.419 ENCOUNTER FOR GYNECOLOGICAL EXAMINATION WITHOUT ABNORMAL FINDING: Primary | ICD-10-CM

## 2023-04-03 DIAGNOSIS — Z12.39 SCREENING BREAST EXAMINATION: ICD-10-CM

## 2023-04-03 PROCEDURE — 3008F PR BODY MASS INDEX (BMI) DOCUMENTED: ICD-10-PCS | Mod: CPTII,,,

## 2023-04-03 PROCEDURE — 99213 OFFICE O/P EST LOW 20 MIN: CPT | Mod: PBBFAC

## 2023-04-03 PROCEDURE — 87591 N.GONORRHOEAE DNA AMP PROB: CPT

## 2023-04-03 PROCEDURE — 3074F PR MOST RECENT SYSTOLIC BLOOD PRESSURE < 130 MM HG: ICD-10-PCS | Mod: CPTII,,,

## 2023-04-03 PROCEDURE — 3008F BODY MASS INDEX DOCD: CPT | Mod: CPTII,,,

## 2023-04-03 PROCEDURE — 99395 PREV VISIT EST AGE 18-39: CPT | Mod: S$PBB,,,

## 2023-04-03 PROCEDURE — 87624 HPV HI-RISK TYP POOLED RSLT: CPT

## 2023-04-03 PROCEDURE — 1159F PR MEDICATION LIST DOCUMENTED IN MEDICAL RECORD: ICD-10-PCS | Mod: CPTII,,,

## 2023-04-03 PROCEDURE — 3074F SYST BP LT 130 MM HG: CPT | Mod: CPTII,,,

## 2023-04-03 PROCEDURE — 88175 CYTOPATH C/V AUTO FLUID REDO: CPT

## 2023-04-03 PROCEDURE — 99395 PR PREVENTIVE VISIT,EST,18-39: ICD-10-PCS | Mod: S$PBB,,,

## 2023-04-03 PROCEDURE — 99999 PR PBB SHADOW E&M-EST. PATIENT-LVL III: CPT | Mod: PBBFAC,,,

## 2023-04-03 PROCEDURE — 1159F MED LIST DOCD IN RCRD: CPT | Mod: CPTII,,,

## 2023-04-03 PROCEDURE — 3078F PR MOST RECENT DIASTOLIC BLOOD PRESSURE < 80 MM HG: ICD-10-PCS | Mod: CPTII,,,

## 2023-04-03 PROCEDURE — 3078F DIAST BP <80 MM HG: CPT | Mod: CPTII,,,

## 2023-04-03 PROCEDURE — 99999 PR PBB SHADOW E&M-EST. PATIENT-LVL III: ICD-10-PCS | Mod: PBBFAC,,,

## 2023-04-03 PROCEDURE — 81514 NFCT DS BV&VAGINITIS DNA ALG: CPT
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
## 2023-04-05 LAB
BACTERIAL VAGINOSIS DNA: NEGATIVE
CANDIDA GLABRATA DNA: NEGATIVE
CANDIDA KRUSEI DNA: NEGATIVE
CANDIDA RRNA VAG QL PROBE: NEGATIVE
T VAGINALIS RRNA GENITAL QL PROBE: NEGATIVE

## 2023-04-06 LAB
HPV HR 12 DNA SPEC QL NAA+PROBE: NEGATIVE
HPV16 AG SPEC QL: NEGATIVE
HPV18 DNA SPEC QL NAA+PROBE: NEGATIVE

## 2023-04-07 LAB
C TRACH DNA SPEC QL NAA+PROBE: NOT DETECTED
N GONORRHOEA DNA SPEC QL NAA+PROBE: NOT DETECTED

## 2023-04-11 LAB
FINAL PATHOLOGIC DIAGNOSIS: NORMAL
Lab: NORMAL

## 2023-07-05 ENCOUNTER — OFFICE VISIT (OUTPATIENT)
Dept: OBSTETRICS AND GYNECOLOGY | Facility: CLINIC | Age: 33
End: 2023-07-05
Payer: MEDICAID

## 2023-07-05 VITALS — BODY MASS INDEX: 26.31 KG/M2 | WEIGHT: 168 LBS | SYSTOLIC BLOOD PRESSURE: 100 MMHG | DIASTOLIC BLOOD PRESSURE: 60 MMHG

## 2023-07-05 DIAGNOSIS — N76.0 ACUTE VAGINITIS: Primary | ICD-10-CM

## 2023-07-05 PROCEDURE — 99212 OFFICE O/P EST SF 10 MIN: CPT | Mod: PBBFAC | Performed by: PHYSICIAN ASSISTANT

## 2023-07-05 PROCEDURE — 3074F PR MOST RECENT SYSTOLIC BLOOD PRESSURE < 130 MM HG: ICD-10-PCS | Mod: CPTII,,, | Performed by: PHYSICIAN ASSISTANT

## 2023-07-05 PROCEDURE — 81514 NFCT DS BV&VAGINITIS DNA ALG: CPT | Performed by: PHYSICIAN ASSISTANT

## 2023-07-05 PROCEDURE — 3008F PR BODY MASS INDEX (BMI) DOCUMENTED: ICD-10-PCS | Mod: CPTII,,, | Performed by: PHYSICIAN ASSISTANT

## 2023-07-05 PROCEDURE — 87591 N.GONORRHOEAE DNA AMP PROB: CPT | Performed by: PHYSICIAN ASSISTANT

## 2023-07-05 PROCEDURE — 3074F SYST BP LT 130 MM HG: CPT | Mod: CPTII,,, | Performed by: PHYSICIAN ASSISTANT

## 2023-07-05 PROCEDURE — 3008F BODY MASS INDEX DOCD: CPT | Mod: CPTII,,, | Performed by: PHYSICIAN ASSISTANT

## 2023-07-05 PROCEDURE — 99214 OFFICE O/P EST MOD 30 MIN: CPT | Mod: S$PBB,,, | Performed by: PHYSICIAN ASSISTANT

## 2023-07-05 PROCEDURE — 3078F DIAST BP <80 MM HG: CPT | Mod: CPTII,,, | Performed by: PHYSICIAN ASSISTANT

## 2023-07-05 PROCEDURE — 3078F PR MOST RECENT DIASTOLIC BLOOD PRESSURE < 80 MM HG: ICD-10-PCS | Mod: CPTII,,, | Performed by: PHYSICIAN ASSISTANT

## 2023-07-05 PROCEDURE — 99999 PR PBB SHADOW E&M-EST. PATIENT-LVL II: ICD-10-PCS | Mod: PBBFAC,,, | Performed by: PHYSICIAN ASSISTANT

## 2023-07-05 PROCEDURE — 99214 PR OFFICE/OUTPT VISIT, EST, LEVL IV, 30-39 MIN: ICD-10-PCS | Mod: S$PBB,,, | Performed by: PHYSICIAN ASSISTANT

## 2023-07-05 PROCEDURE — 99999 PR PBB SHADOW E&M-EST. PATIENT-LVL II: CPT | Mod: PBBFAC,,, | Performed by: PHYSICIAN ASSISTANT

## 2023-07-05 RX ORDER — METRONIDAZOLE 500 MG/1
500 TABLET ORAL 2 TIMES DAILY
Qty: 14 TABLET | Refills: 0 | Status: SHIPPED | OUTPATIENT
Start: 2023-07-05 | End: 2023-07-12

## 2023-07-05 NOTE — PROGRESS NOTES
Damon Noguera is a 33 y.o. female  presents with complaint of vaginal odor for  a few  weeks. She reports white discharge.   She denies itching and burning.  She reports h/o vaginitis. Denies nausea, vomiting, diarrhea, constipation, dysuria, dyspareunia, abdominal pain. She would like STD screening at this visit. No other concerns or complaints at this visit.     Past Medical History:   Diagnosis Date    Back problem     Neck problem      Past Surgical History:   Procedure Laterality Date     SECTION      KNEE SURGERY      LIPOSUCTION OF ABDOMEN      2021     Social History     Tobacco Use    Smoking status: Former    Smokeless tobacco: Never   Substance Use Topics    Alcohol use: Yes     Comment: socially    Drug use: No     Family History   Problem Relation Age of Onset    Dementia Mother     Diabetes Father     Post-traumatic stress disorder Sister     Anxiety disorder Sister     Depression Sister     Diabetes Maternal Grandmother     Alcohol abuse Paternal Grandfather     Cancer Neg Hx     Hypertension Neg Hx      OB History    Para Term  AB Living   4 2 2   2 2   SAB IAB Ectopic Multiple Live Births     2     2      # Outcome Date GA Lbr Maikel/2nd Weight Sex Delivery Anes PTL Lv   4 Term 09 39w0d  3.402 kg (7 lb 8 oz) M CS-LTranv Gen N DOMINIC   3 Term 05 40w0d  2.722 kg (6 lb) F Vag-Spont None N DOMINIC   2 IAB            1 IAB                /60   Wt 76.2 kg (167 lb 15.9 oz)   LMP 2023   BMI 26.31 kg/m²     ROS:  GENERAL: No fever, chills, fatigability or weight loss.  VULVAR: No pain, no lesions and no itching.  VAGINAL: No relaxation, no abnormal bleeding and no lesions. + odor and discharge  ABDOMEN: No abdominal pain. Denies nausea. Denies vomiting. No diarrhea. No constipation  BREAST: Denies pain. No lumps. No discharge.  URINARY: No incontinence, no nocturia, no frequency and no dysuria.  CARDIOVASCULAR: No chest pain. No shortness of breath. No  leg cramps.  NEUROLOGICAL: No headaches. No vision changes.    PHYSICAL EXAM:  VULVA: normal appearing vulva with no masses, tenderness or lesions   VAGINA: normal appearing vagina with normal color. No discharge, no lesions   CERVIX: normal appearing cervix without discharge or lesions   UTERUS: uterus is normal size, shape, consistency and nontender   ADNEXA: normal adnexa in size, nontender and no masses    ASSESSMENT and PLAN:    ICD-10-CM ICD-9-CM    1. Acute vaginitis  N76.0 616.10 Vaginosis Screen by DNA Probe      C. trachomatis/N. gonorrhoeae by AMP DNA [facility]; Cervicovaginal      metroNIDAZOLE (FLAGYL) 500 MG tablet          Impression: bacterial vaginosis  Plan:   Prescriptions as noted in orders    Vaginitis Prevention:  - Avoiding feminine products such as deoderant soaps, body wash, bubble bath, douches, scented toilet paper, deoderant tampons or pads, feminine wipes, chronic pad use, etc. If you wash with soap make sure its hypo allergic (free of added scents or colors)   - Avoiding other vulvovaginal irritants such as long hot baths, humidity, tight, synthetic clothing, chlorine and sitting around in wet bathing suits  - Wearing cotton underwear, avoiding thong underwear and no underwear to bed-wear loose cotton bottoms to bed   - Taking showers instead of baths and use a hair dryer on cool setting afterwards to dry  - Wearing cotton to exercise and shower immediately after exercise and change clothes  - Using polyurethane condoms without spermicide if sexually active and symptoms are triggered by intercourse  - Use laundry detergent without added perfumes or colors   - Do not have sexual intercourse until symptoms have gone away   - Increase your intake of probiotics--you can get these by eating yogurt/greek yogurt or by buying over the counter probiotic supplements     Probiotic Information:   Any probiotic you choose needs to have ALL of the following components (Each needs to be >10 colony  forming units [CFUs]):   - L. Acidophilus  - L. Rhamnosus  - L. Fermentum       FOLLOW UP: PRN lack of improvement.  Xiomara Hinds PA-C

## 2023-07-07 LAB
BACTERIAL VAGINOSIS DNA: POSITIVE
C TRACH DNA SPEC QL NAA+PROBE: NOT DETECTED
CANDIDA GLABRATA DNA: NEGATIVE
CANDIDA KRUSEI DNA: NEGATIVE
CANDIDA RRNA VAG QL PROBE: NEGATIVE
N GONORRHOEA DNA SPEC QL NAA+PROBE: NOT DETECTED
T VAGINALIS RRNA GENITAL QL PROBE: NEGATIVE

## 2023-11-04 NOTE — PATIENT INSTRUCTIONS
